# Patient Record
Sex: FEMALE | Race: WHITE | NOT HISPANIC OR LATINO | Employment: FULL TIME | URBAN - METROPOLITAN AREA
[De-identification: names, ages, dates, MRNs, and addresses within clinical notes are randomized per-mention and may not be internally consistent; named-entity substitution may affect disease eponyms.]

---

## 2020-06-11 ENCOUNTER — APPOINTMENT (OUTPATIENT)
Dept: GENERAL RADIOLOGY | Facility: HOSPITAL | Age: 40
End: 2020-06-11

## 2020-06-11 ENCOUNTER — HOSPITAL ENCOUNTER (EMERGENCY)
Facility: HOSPITAL | Age: 40
Discharge: HOME | End: 2020-06-11
Admitting: EMERGENCY MEDICINE

## 2020-06-11 VITALS
DIASTOLIC BLOOD PRESSURE: 56 MMHG | OXYGEN SATURATION: 98 % | WEIGHT: 102.73 LBS | TEMPERATURE: 98.4 F | HEART RATE: 84 BPM | BODY MASS INDEX: 19.4 KG/M2 | HEIGHT: 61 IN | SYSTOLIC BLOOD PRESSURE: 110 MMHG | RESPIRATION RATE: 16 BRPM

## 2020-06-11 DIAGNOSIS — S93.401A SPRAIN OF RIGHT ANKLE, UNSPECIFIED LIGAMENT, INITIAL ENCOUNTER: ICD-10-CM

## 2020-06-11 DIAGNOSIS — S93.601A SPRAIN OF RIGHT FOOT, INITIAL ENCOUNTER: ICD-10-CM

## 2020-06-11 DIAGNOSIS — M79.671 RIGHT FOOT PAIN: Primary | ICD-10-CM

## 2020-06-11 PROCEDURE — 99283 EMERGENCY DEPT VISIT LOW MDM: CPT

## 2020-06-11 PROCEDURE — 73630 X-RAY EXAM OF FOOT: CPT

## 2020-06-11 PROCEDURE — 73610 X-RAY EXAM OF ANKLE: CPT

## 2020-06-11 RX ORDER — DICLOFENAC SODIUM 75 MG/1
75 TABLET, DELAYED RELEASE ORAL 2 TIMES DAILY PRN
Qty: 20 TABLET | Refills: 0 | Status: SHIPPED | OUTPATIENT
Start: 2020-06-11

## 2020-06-12 NOTE — ED PROVIDER NOTES
Subjective   Patient is a 39-year-old female who presents with right foot and ankle pain she states that she rolled it this morning and then this evening it gave out on her.  She was unable to bear weight she came in a wheelchair.  She rates her pain a 7/10 she states it is worse with weightbearing or movement of the ankle.  She did not take anything for discomfort prior to arrival          Review of Systems   Constitutional: Negative for chills, fatigue and fever.   HENT: Negative for congestion, tinnitus and trouble swallowing.    Eyes: Negative for photophobia, discharge and redness.   Respiratory: Negative for cough and shortness of breath.    Cardiovascular: Negative for chest pain and palpitations.   Gastrointestinal: Negative for abdominal pain, diarrhea, nausea and vomiting.   Genitourinary: Negative for dysuria, frequency and urgency.   Musculoskeletal: Negative for back pain, joint swelling and myalgias.        Right foot and ankle pain   Skin: Negative for rash.   Neurological: Negative for dizziness and headaches.   Psychiatric/Behavioral: Negative for confusion.   All other systems reviewed and are negative.      History reviewed. No pertinent past medical history.    Allergies   Allergen Reactions   • Celecoxib Unknown - High Severity   • Latex Rash       History reviewed. No pertinent surgical history.    No family history on file.    Social History     Socioeconomic History   • Marital status: Single     Spouse name: Not on file   • Number of children: Not on file   • Years of education: Not on file   • Highest education level: Not on file           Objective   Physical Exam   Constitutional: She is oriented to person, place, and time. She appears well-developed and well-nourished.   HENT:   Head: Normocephalic and atraumatic.   Eyes: Pupils are equal, round, and reactive to light. Conjunctivae and EOM are normal.   Neck: Normal range of motion. Neck supple.   Cardiovascular: Normal rate, regular  "rhythm, normal heart sounds and intact distal pulses.   Pulmonary/Chest: Effort normal and breath sounds normal. No respiratory distress. She has no wheezes.   Abdominal: She exhibits no distension and no mass. There is no tenderness. There is no rebound and no guarding.   Musculoskeletal: Normal range of motion. She exhibits no deformity.        Right ankle: She exhibits normal range of motion, no swelling, no deformity and normal pulse. Achilles tendon exhibits no pain.   Neurological: She is alert and oriented to person, place, and time. She displays normal reflexes. No cranial nerve deficit or sensory deficit. GCS eye subscore is 4. GCS verbal subscore is 5. GCS motor subscore is 6.   Skin: Skin is warm, dry and intact. Capillary refill takes less than 2 seconds. No rash noted.   Psychiatric: Her speech is normal and behavior is normal. Judgment and thought content normal. Her mood appears anxious. Cognition and memory are normal.       Procedures           ED Course      /43   Pulse 85   Temp 98.5 °F (36.9 °C) (Oral)   Resp 16   Ht 154.9 cm (61\")   Wt 46.6 kg (102 lb 11.8 oz)   LMP 05/27/2020   SpO2 98%   BMI 19.41 kg/m²   Labs Reviewed - No data to display  Medications - No data to display  Xr Ankle 3+ View Right    Result Date: 6/11/2020  No fractures or dislocations of the ankle. No significant degenerative change or erosive changes.  Electronically Signed By-Andrea Torres DO. On:6/11/2020 10:43 PM This report was finalized on [id number] by  Andrea Torres DO..    Xr Foot 3+ View Right    Result Date: 6/11/2020  No bony abnormality.  Electronically Signed By-Andrea Torres DO. On:6/11/2020 10:44 PM This report was finalized on [id number] by  Andrea Torres DO..                                         MDM  Number of Diagnoses or Management Options  Diagnosis management comments: Patient had x-rays which were read to be negative.  Patient was placed in a cam walker and was distally " neurovascularly intact after placement she was given crutches and crutch training by the nursing staff and advised to follow-up with Dr. mccauley patient verbalized understood discharge instructions she will be discharged with diclofenac    Patient Progress  Patient progress: stable      Final diagnoses:   Right foot pain   Sprain of right foot, initial encounter   Sprain of right ankle, unspecified ligament, initial encounter            Jolie Seymour, APRN  06/11/20 [id number]

## 2020-06-12 NOTE — DISCHARGE INSTRUCTIONS
The cam walker as needed for pain use crutches as needed as well follow-up with podiatry for any further problems and return if worse use diclofenac as needed for inflammation and pain do not mix with Motrin ibuprofen Advil or Aleve